# Patient Record
Sex: FEMALE | Race: BLACK OR AFRICAN AMERICAN | NOT HISPANIC OR LATINO | URBAN - METROPOLITAN AREA
[De-identification: names, ages, dates, MRNs, and addresses within clinical notes are randomized per-mention and may not be internally consistent; named-entity substitution may affect disease eponyms.]

---

## 2018-08-16 ENCOUNTER — EMERGENCY (EMERGENCY)
Facility: HOSPITAL | Age: 23
LOS: 1 days | Discharge: HOME | End: 2018-08-16
Admitting: EMERGENCY MEDICINE
Payer: MEDICAID

## 2018-08-16 VITALS
SYSTOLIC BLOOD PRESSURE: 128 MMHG | RESPIRATION RATE: 18 BRPM | DIASTOLIC BLOOD PRESSURE: 85 MMHG | OXYGEN SATURATION: 100 % | TEMPERATURE: 98 F

## 2018-08-16 DIAGNOSIS — N39.0 URINARY TRACT INFECTION, SITE NOT SPECIFIED: ICD-10-CM

## 2018-08-16 DIAGNOSIS — R10.30 LOWER ABDOMINAL PAIN, UNSPECIFIED: ICD-10-CM

## 2018-08-16 PROCEDURE — 99283 EMERGENCY DEPT VISIT LOW MDM: CPT

## 2018-08-16 RX ORDER — PHENAZOPYRIDINE HCL 100 MG
2 TABLET ORAL
Qty: 12 | Refills: 0 | OUTPATIENT
Start: 2018-08-16 | End: 2018-08-17

## 2018-08-16 RX ORDER — NITROFURANTOIN MACROCRYSTAL 50 MG
100 CAPSULE ORAL
Qty: 0 | Refills: 0 | Status: DISCONTINUED | OUTPATIENT
Start: 2018-08-16 | End: 2018-08-19

## 2018-08-16 RX ORDER — NITROFURANTOIN MACROCRYSTAL 50 MG
1 CAPSULE ORAL
Qty: 20 | Refills: 0 | OUTPATIENT
Start: 2018-08-16 | End: 2018-08-25

## 2018-08-16 RX ORDER — CEPHALEXIN 500 MG
1 CAPSULE ORAL
Qty: 20 | Refills: 0 | OUTPATIENT
Start: 2018-08-16 | End: 2018-08-25

## 2018-08-16 RX ORDER — PHENAZOPYRIDINE HCL 100 MG
200 TABLET ORAL ONCE
Qty: 0 | Refills: 0 | Status: COMPLETED | OUTPATIENT
Start: 2018-08-16 | End: 2018-08-16

## 2018-08-16 RX ADMIN — Medication 200 MILLIGRAM(S): at 15:28

## 2018-08-16 RX ADMIN — Medication 100 MILLIGRAM(S): at 15:28

## 2018-08-16 NOTE — ED PROVIDER NOTE - PROGRESS NOTE DETAILS
pt reevaluation, abd exam nml. pt is eating and drinking. UA is + for microbids and edison cultures, otherwise she is stable for discharge.

## 2018-08-16 NOTE — ED PROVIDER NOTE - MEDICAL DECISION MAKING DETAILS
pt presenting w/ nausea, vomiting and loose stools, sx are improving, vital signs stable. Pt well appearing, exam unremarkable, will send in for pregnancy test and r/o UTI, no clinical indication for labs or imaging, will reassess.

## 2018-08-16 NOTE — ED PROVIDER NOTE - OBJECTIVE STATEMENT
23 y/o F w/ no pmhx presents to ED w/ 3 days of NBNB emesis and some loose stool, some lower abd pain, non radiating and some urinary frequency. Pt admits that today sx have improved, tolerating fluids although decrease appetite. Otherwise denies any fever, chills, hematuria, dysuria. No sick contacts, no recent travels, no recent abx or hospitalization.
